# Patient Record
Sex: FEMALE | Race: WHITE | Employment: UNEMPLOYED | URBAN - METROPOLITAN AREA
[De-identification: names, ages, dates, MRNs, and addresses within clinical notes are randomized per-mention and may not be internally consistent; named-entity substitution may affect disease eponyms.]

---

## 2017-10-22 ENCOUNTER — ED HISTORICAL/CONVERTED ENCOUNTER (OUTPATIENT)
Dept: OTHER | Age: 32
End: 2017-10-22

## 2017-11-02 ENCOUNTER — OP HISTORICAL/CONVERTED ENCOUNTER (OUTPATIENT)
Dept: OTHER | Age: 32
End: 2017-11-02

## 2017-11-06 ENCOUNTER — ED HISTORICAL/CONVERTED ENCOUNTER (OUTPATIENT)
Dept: OTHER | Age: 32
End: 2017-11-06

## 2017-12-03 ENCOUNTER — ED HISTORICAL/CONVERTED ENCOUNTER (OUTPATIENT)
Dept: OTHER | Age: 32
End: 2017-12-03

## 2018-01-04 ENCOUNTER — ED HISTORICAL/CONVERTED ENCOUNTER (OUTPATIENT)
Dept: OTHER | Age: 33
End: 2018-01-04

## 2019-02-18 ENCOUNTER — ED HISTORICAL/CONVERTED ENCOUNTER (OUTPATIENT)
Dept: OTHER | Age: 34
End: 2019-02-18

## 2019-08-21 ENCOUNTER — OFFICE VISIT (OUTPATIENT)
Dept: NEUROLOGY | Age: 34
End: 2019-08-21

## 2019-08-21 VITALS
DIASTOLIC BLOOD PRESSURE: 74 MMHG | TEMPERATURE: 98.2 F | HEART RATE: 78 BPM | OXYGEN SATURATION: 97 % | HEIGHT: 65 IN | SYSTOLIC BLOOD PRESSURE: 118 MMHG | RESPIRATION RATE: 16 BRPM | WEIGHT: 179 LBS | BODY MASS INDEX: 29.82 KG/M2

## 2019-08-21 DIAGNOSIS — G43.019 INTRACTABLE MIGRAINE WITHOUT AURA AND WITHOUT STATUS MIGRAINOSUS: Primary | ICD-10-CM

## 2019-08-21 DIAGNOSIS — R20.0 LEFT ARM NUMBNESS: ICD-10-CM

## 2019-08-21 RX ORDER — BUTALBITAL, ACETAMINOPHEN AND CAFFEINE 50; 325; 40 MG/1; MG/1; MG/1
TABLET ORAL
Refills: 0 | COMMUNITY
Start: 2019-07-17 | End: 2019-10-10 | Stop reason: ALTCHOICE

## 2019-08-21 RX ORDER — NORTRIPTYLINE HYDROCHLORIDE 25 MG/1
25 CAPSULE ORAL
Qty: 30 CAP | Refills: 1 | Status: SHIPPED | OUTPATIENT
Start: 2019-08-21

## 2019-08-21 RX ORDER — ZOLMITRIPTAN 5 MG/1
TABLET, FILM COATED ORAL
Qty: 9 TAB | Refills: 1 | Status: SHIPPED | OUTPATIENT
Start: 2019-08-21 | End: 2019-10-10

## 2019-08-21 RX ORDER — ACETAMINOPHEN 325 MG/1
TABLET ORAL
COMMUNITY

## 2019-08-21 NOTE — PROGRESS NOTES
Chief Complaint   Patient presents with    Migraine     numbness in Left arm     Visit Vitals  /74 (BP 1 Location: Left arm, BP Patient Position: Sitting)   Pulse 78   Temp 98.2 °F (36.8 °C) (Oral)   Resp 16   Ht 5' 5\" (1.651 m)   Wt 81.2 kg (179 lb)   SpO2 97%   BMI 29.79 kg/m²

## 2019-08-21 NOTE — PROGRESS NOTES
Name: Jorge L Ogden      :  1985    PCP:   No primary care provider on file. Referring:  Self  MRN:   <[id number]>    Chief Complaint:   Chief Complaint   Patient presents with    Migraine     numbness in Left arm       HISTORY OF PRESENT ILLNESS:       This is a 36 y.o. female with PMHx Asthma, Migraine (starting around 14 yo), presents for evaluation of migraine and left arm numbness. Pt describes she's had migraine since teenager. Was having migraine 1-2 days a month up until aroudn 2 months ago when they became more frequent, 3-4 days a week. Denies any precipitating head injury. Describes migraine as involving back of head, + nausea, no vomiting, +light sensitive, no sound sensitivity. Sometimes she'll have blurry vision before the headache, but denies any formed visual patterns/ aura prior migraine. Duration: 3 hours to remainder of day. Taking tylenol for headaches 4 days a week (2 tabs up to 3 times a day) for the past 3 months. Recalls being on Topamax in -, reduced headaches, got pregnant so she stopped taking it. Didn't like it due to change taste of soda. She reports 4 episodes within the past few months where she was having severe headache and also had left arm numbness. Has been to ER ([facility], [facility], [facility]) once for this and they did CT head which was reportedly normal.  Treated her for headache and recommended she Neurology to discuss getting MRI Brain. She tried to f/u with her prior Neurologist/ Dr Franky Cade but she needed to get a referral.  Increased financial stressors, boyfriend's hospital bills, etc.       Complete Review of Systems: + anxiety, constipation, fatigue, headaches, occasional nausea, decreased appetite, snoring, weight change; otherwise as noted in HPI     Allergies not on file     No past medical history on file.        Current Outpatient Medications   Medication Sig Dispense Refill    acetaminophen (TYLENOL) 325 mg tablet Take by mouth every four (4) hours as needed for Pain.  butalbital-acetaminophen-caffeine (FIORICET, ESGIC) -40 mg per tablet TAKE 1 2 TABLET BY MOUTH EVERY 4 TO 6 HOURS AS NEEDED FOR MIGRAINE HEADACHE  0    nortriptyline (PAMELOR) 25 mg capsule Take 1 Cap by mouth nightly. Anti-depressant to reduce headache frequency 30 Cap 1    ZOLMitriptan (ZOMIG) 5 mg tablet Take one tablet at onset of migraine. Limit use to 2 days per week. 9 Tab 1     No past surgical history on file. No family history on file.   Social History     Socioeconomic History    Marital status: SINGLE     Spouse name: Not on file    Number of children: Not on file    Years of education: Not on file    Highest education level: Not on file   Occupational History    Not on file   Social Needs    Financial resource strain: Not on file    Food insecurity:     Worry: Not on file     Inability: Not on file    Transportation needs:     Medical: Not on file     Non-medical: Not on file   Tobacco Use    Smoking status: Not on file   Substance and Sexual Activity    Alcohol use: Not on file    Drug use: Not on file    Sexual activity: Not on file   Lifestyle    Physical activity:     Days per week: Not on file     Minutes per session: Not on file    Stress: Not on file   Relationships    Social connections:     Talks on phone: Not on file     Gets together: Not on file     Attends Alevism service: Not on file     Active member of club or organization: Not on file     Attends meetings of clubs or organizations: Not on file     Relationship status: Not on file    Intimate partner violence:     Fear of current or ex partner: Not on file     Emotionally abused: Not on file     Physically abused: Not on file     Forced sexual activity: Not on file   Other Topics Concern    Not on file   Social History Narrative    Not on file       PHYSICAL EXAM  Vitals:    08/21/19 0902   BP: 118/74   BP 1 Location: Left arm   BP Patient Position: Sitting   Pulse: 78   Resp: 16   Temp: 98.2 °F (36.8 °C)   TempSrc: Oral   SpO2: 97%   Weight: 81.2 kg (179 lb)   Height: 5' 5\" (1.651 m)       General:  Alert, cooperative, NAD   Head:  Normocephalic, atraumatic. Eyes:  Conjunctivae/corneas clear   Lungs:  Heart:  Non labored breathing  Regular rate, rhythm   Extremities: No edema. Skin: No rashes    Neurologic Exam       Language: normal  Memory:  Alert, oriented to person, place, situation    Cranial Nerves:  I: smell Not tested   II: visual fields Full to confrontation   II: pupils Equal, round, reactive to light   II: optic disc No papilledema   III,VII: ptosis none   III,IV,VI: extraocular muscles  normal   V: facial light touch sensation  normal   VII: facial muscle function  symmetric   VIII: hearing symmetric   IX: soft palate elevation  normal   XI: sternocleidomastoid strength 5/5   XII: tongue  midline      Motor: normal bulk, tone, strength in all exts  Sensory: intact to LT, PP, temp, vibration x 4 exts   Cerebellar: no rest, postural, or intention tremor  Normal FNF and H-Shin bilaterally  Reflexes: 2+ throughout  Plantar response: neutral bilaterally    Gait: antalgic gait due to right ankle pain (recently twisted ankle)   Romberg negative       ASSESSMENT AND PLAN    ICD-10-CM ICD-9-CM    1. Intractable migraine without aura and without status migrainosus G43.019 346.11 MRI BRAIN WO CONT      nortriptyline (PAMELOR) 25 mg capsule      ZOLMitriptan (ZOMIG) 5 mg tablet   2.  Left arm numbness R20.0 782.0 MRI BRAIN WO CONT      EEG       Check MRI Brain and EEG regarding left arm numbness  Rx'd Nortriptyline 25 mg QHS to reduce headache frequency  Rx'd Zomig 5 mg tablet to use up to 2 days a week prn migraine  (pt says she's tried/ failed: Imitrex tablet, Maxalt tablet)  Advised to stop using all OTC headache preventives, causing rebound headache  Pt agreeable with above plan  F/u in 6 weeks      Signed By: Tino Ferrer MD     August 21, 2019

## 2019-08-26 ENCOUNTER — DOCUMENTATION ONLY (OUTPATIENT)
Dept: NEUROLOGY | Age: 34
End: 2019-08-26

## 2019-08-26 NOTE — PROCEDURES
Name:   Jese Almeida  YOB: 1985  MRN:   [id number]           Procedure:   EEG     Location:   [facility]  Date of Recordin19  Date of Interpretation:    19    Interpreting physician: Tameka Pinto MD  Requesting provider:   As above     Indication: 36 y.o. female with complaints of numbness in left arm    Current medications: has a current medication list which includes the following prescription(s): acetaminophen, butalbital-acetaminophen-caffeine, nortriptyline, and zolmitriptan. Technical:   Digital EEG recorded in 10-20 international placement system, multiple montages    Interpretation:   Patient level of alertness: awake  Background pattern: symmetric. Posterior dominant rhythm: 8-9 Hz. Photic stimulation: no clear driving response on either side. Hyperventilation: not performed. Drowsiness recorded: no.  Sleep recorded: no.  Single lead EKG: no abnormalities. Areas of focal slowing: none. Epileptiform discharges: none. Electrographic seizures: none. Impression: This is a normal awake EEG recording. Clinical correlation is necessary.         Tameka Pinto MD  [facility]

## 2019-09-03 ENCOUNTER — HOSPITAL ENCOUNTER (OUTPATIENT)
Dept: MRI IMAGING | Age: 34
Discharge: HOME | End: 2019-09-03
Attending: PSYCHIATRY & NEUROLOGY
Payer: MEDICAID

## 2019-09-03 DIAGNOSIS — R20.0 LEFT ARM NUMBNESS: ICD-10-CM

## 2019-09-03 DIAGNOSIS — G43.019 INTRACTABLE MIGRAINE WITHOUT AURA AND WITHOUT STATUS MIGRAINOSUS: ICD-10-CM

## 2019-09-03 PROCEDURE — 70551 MRI BRAIN STEM W/O DYE: CPT

## 2019-10-10 ENCOUNTER — OFFICE VISIT (OUTPATIENT)
Dept: NEUROLOGY | Age: 34
End: 2019-10-10

## 2019-10-10 VITALS — WEIGHT: 179 LBS | RESPIRATION RATE: 20 BRPM | HEIGHT: 65 IN | BODY MASS INDEX: 29.82 KG/M2

## 2019-10-10 DIAGNOSIS — G43.019 INTRACTABLE MIGRAINE WITHOUT AURA AND WITHOUT STATUS MIGRAINOSUS: Primary | ICD-10-CM

## 2019-10-10 RX ORDER — ELETRIPTAN HYDROBROMIDE 40 MG/1
TABLET, FILM COATED ORAL
Qty: 9 TAB | Refills: 2 | Status: SHIPPED | OUTPATIENT
Start: 2019-10-10

## 2019-10-10 NOTE — PROGRESS NOTES
Neurology Progress Note    Patient ID:   Maru Rodrigues  [id number]  36 y.o.  1985    Date of Office Visit: 10/10/19    Chief Complaint   Patient presents with    Headache     Interval Hx:     # of headache days a week: 2-3  # of migraine days a week: 2    Has not been taking Nortriptyline regularly, no SEFx, just doesn't like idea of taking a daily medication. Zomig caused her to have hives (went to ER)      Brief ROS: as noted above or otherwise negative    Objective: Allergies   Allergen Reactions    Latex Hives    Amoxicillin Hives    Morphine Nausea and Vomiting    Pcn [Penicillins] Hives       PMHx:  Past Medical History:   Diagnosis Date    Asthma      PSHx:  has a past surgical history that includes hx gyn and hx gyn. Current Outpatient Medications on File Prior to Visit   Medication Sig    acetaminophen (TYLENOL) 325 mg tablet Take  by mouth every four (4) hours as needed for Pain.  nortriptyline (PAMELOR) 25 mg capsule Take 1 Cap by mouth nightly. Anti-depressant to reduce headache frequency     No current facility-administered medications on file prior to visit. Physical Exam  Vitals:    10/10/19 1001   Resp: 20   Weight: 81.2 kg (179 lb)   Height: 5' 5\" (1.651 m)       General:   Mental status: Awake, alert, cooperative. Neck: supple  Extremities: no edema  Skin: no rashes    Neuro Exam:    CNs:   Facial movements: symmetric. Visual fields; intact in all quadrants, bilateral EOM: conjugate eye movements bilateral  Hearing: normal  Speech: no aphasia, no dysarthria, normal fluency    Motor:  Strength: 5/5 in upper and lower extremities, symmetric. Coordination: No resting, postural, or intention tremors. Sensory: intact to LT in all exts. Reflexes:  1+ throughout  Gait: not observed    Assessment:       ICD-10-CM ICD-9-CM    1.  Intractable migraine without aura and without status migrainosus G43.019 346.11 eletriptan (RELPAX) 40 mg tablet     Discussed with patient that with frequent migraine, the only way to reduce headache frequency is to take a daily headache preventive medication, such as Nortriptyline. Discussed other meds (topamax, propranolol, etc) and their potential SEFx, or not taking any headache preventive medication (which I didn't recommend in her situation). She was agreeable to trying to take the Nortriptyline QHS to reduce headache frequency. D/c'd zomig from her med list.  Rx'd Relpax 40 mg prn migraine, limit use up to 2 days a week. Follow up in 3 months.        Signed By: Waqas Rose MD     October 10, 2019

## 2021-05-20 ENCOUNTER — HOSPITAL ENCOUNTER (EMERGENCY)
Age: 36
Discharge: HOME | End: 2021-05-21
Admitting: EMERGENCY MEDICINE
Payer: MEDICAID

## 2021-05-20 VITALS
SYSTOLIC BLOOD PRESSURE: 126 MMHG | OXYGEN SATURATION: 98 % | WEIGHT: 178 LBS | BODY MASS INDEX: 29.66 KG/M2 | DIASTOLIC BLOOD PRESSURE: 83 MMHG | HEIGHT: 65 IN | HEART RATE: 104 BPM | TEMPERATURE: 99 F | RESPIRATION RATE: 14 BRPM

## 2021-05-20 DIAGNOSIS — R50.9 FEVER, UNSPECIFIED FEVER CAUSE: ICD-10-CM

## 2021-05-20 DIAGNOSIS — R51.9 NONINTRACTABLE HEADACHE, UNSPECIFIED CHRONICITY PATTERN, UNSPECIFIED HEADACHE TYPE: Primary | ICD-10-CM

## 2021-05-20 LAB
ALBUMIN SERPL-MCNC: 3 G/DL (ref 3.5–5)
ALBUMIN/GLOB SERPL: 0.7 {RATIO} (ref 1.1–2.2)
ALP SERPL-CCNC: 116 U/L (ref 45–117)
ALT SERPL-CCNC: 41 U/L (ref 12–78)
ANION GAP SERPL CALC-SCNC: 7 MMOL/L (ref 5–15)
APPEARANCE UR: ABNORMAL
AST SERPL W P-5'-P-CCNC: 31 U/L (ref 15–37)
BACTERIA URNS QL MICRO: NEGATIVE /HPF
BASOPHILS # BLD: 0.1 K/UL (ref 0–0.1)
BASOPHILS NFR BLD: 1 % (ref 0–1)
BILIRUB SERPL-MCNC: 0.3 MG/DL (ref 0.2–1)
BILIRUB UR QL: NEGATIVE
BUN SERPL-MCNC: 9 MG/DL (ref 6–20)
BUN/CREAT SERPL: 13 (ref 12–20)
CA-I BLD-MCNC: 9 MG/DL (ref 8.5–10.1)
CHLORIDE SERPL-SCNC: 106 MMOL/L (ref 97–108)
CO2 SERPL-SCNC: 23 MMOL/L (ref 21–32)
COLOR UR: ABNORMAL
CREAT SERPL-MCNC: 0.67 MG/DL (ref 0.55–1.02)
DIFFERENTIAL METHOD BLD: ABNORMAL
EOSINOPHIL # BLD: 0.4 K/UL (ref 0–0.4)
EOSINOPHIL NFR BLD: 4 % (ref 0–7)
ERYTHROCYTE [DISTWIDTH] IN BLOOD BY AUTOMATED COUNT: 12.8 % (ref 11.5–14.5)
GLOBULIN SER CALC-MCNC: 4.1 G/DL (ref 2–4)
GLUCOSE SERPL-MCNC: 97 MG/DL (ref 65–100)
GLUCOSE UR STRIP.AUTO-MCNC: NEGATIVE MG/DL
HCG SERPL QL: NEGATIVE
HCT VFR BLD AUTO: 40.7 % (ref 35–47)
HGB BLD-MCNC: 13.5 G/DL (ref 11.5–16)
HGB UR QL STRIP: ABNORMAL
IMM GRANULOCYTES # BLD AUTO: 0 K/UL (ref 0–0.04)
IMM GRANULOCYTES NFR BLD AUTO: 0 % (ref 0–0.5)
KETONES UR QL STRIP.AUTO: NEGATIVE MG/DL
LEUKOCYTE ESTERASE UR QL STRIP.AUTO: NEGATIVE
LYMPHOCYTES # BLD: 1.9 K/UL (ref 0.8–3.5)
LYMPHOCYTES NFR BLD: 20 % (ref 12–49)
MCH RBC QN AUTO: 29 PG (ref 26–34)
MCHC RBC AUTO-ENTMCNC: 33.2 G/DL (ref 30–36.5)
MCV RBC AUTO: 87.5 FL (ref 80–99)
MONOCYTES # BLD: 1.6 K/UL (ref 0–1)
MONOCYTES NFR BLD: 17 % (ref 5–13)
NEUTS SEG # BLD: 5.7 K/UL (ref 1.8–8)
NEUTS SEG NFR BLD: 58 % (ref 32–75)
NITRITE UR QL STRIP.AUTO: NEGATIVE
NRBC # BLD: 0 K/UL (ref 0–0.01)
NRBC BLD-RTO: 0 PER 100 WBC
PH UR STRIP: 7 [PH] (ref 5–8)
PLATELET # BLD AUTO: 300 K/UL (ref 150–400)
PMV BLD AUTO: 9.9 FL (ref 8.9–12.9)
POTASSIUM SERPL-SCNC: 4.3 MMOL/L (ref 3.5–5.1)
PROT SERPL-MCNC: 7.1 G/DL (ref 6.4–8.2)
PROT UR STRIP-MCNC: NEGATIVE MG/DL
RBC # BLD AUTO: 4.65 M/UL (ref 3.8–5.2)
RBC #/AREA URNS HPF: ABNORMAL /HPF (ref 0–5)
SODIUM SERPL-SCNC: 136 MMOL/L (ref 136–145)
SP GR UR REFRACTOMETRY: <1.005 (ref 1–1.03)
UROBILINOGEN UR QL STRIP.AUTO: 0.1 EU/DL (ref 0.1–1)
WBC # BLD AUTO: 9.8 K/UL (ref 3.6–11)
WBC URNS QL MICRO: ABNORMAL /HPF (ref 0–4)

## 2021-05-20 PROCEDURE — 74011636637 HC RX REV CODE- 636/637: Performed by: NURSE PRACTITIONER

## 2021-05-20 PROCEDURE — 36415 COLL VENOUS BLD VENIPUNCTURE: CPT

## 2021-05-20 PROCEDURE — 74011250636 HC RX REV CODE- 250/636: Performed by: NURSE PRACTITIONER

## 2021-05-20 PROCEDURE — 81001 URINALYSIS AUTO W/SCOPE: CPT

## 2021-05-20 PROCEDURE — 99283 EMERGENCY DEPT VISIT LOW MDM: CPT

## 2021-05-20 PROCEDURE — 96372 THER/PROPH/DIAG INJ SC/IM: CPT

## 2021-05-20 PROCEDURE — 85025 COMPLETE CBC W/AUTO DIFF WBC: CPT

## 2021-05-20 PROCEDURE — 74011250637 HC RX REV CODE- 250/637: Performed by: NURSE PRACTITIONER

## 2021-05-20 PROCEDURE — 80053 COMPREHEN METABOLIC PANEL: CPT

## 2021-05-20 PROCEDURE — 84703 CHORIONIC GONADOTROPIN ASSAY: CPT

## 2021-05-20 RX ORDER — ACETAMINOPHEN 325 MG/1
650 TABLET ORAL ONCE
Status: COMPLETED | OUTPATIENT
Start: 2021-05-20 | End: 2021-05-20

## 2021-05-20 RX ORDER — SUMATRIPTAN 6 MG/.5ML
6 INJECTION, SOLUTION SUBCUTANEOUS
Status: COMPLETED | OUTPATIENT
Start: 2021-05-20 | End: 2021-05-20

## 2021-05-20 RX ADMIN — SUMATRIPTAN 6 MG: 6 INJECTION, SOLUTION SUBCUTANEOUS at 22:42

## 2021-05-20 RX ADMIN — SODIUM CHLORIDE 1000 ML: 9 INJECTION, SOLUTION INTRAVENOUS at 21:43

## 2021-05-20 RX ADMIN — ACETAMINOPHEN 650 MG: 325 TABLET ORAL at 21:43

## 2021-05-20 NOTE — Clinical Note
[facility] [facility] EMERGENCY DEPT  [facility] [place] [id number]  [phone]    Work/School Note    Date: 5/20/2021    To Whom It May concern:    Jung Pace was seen and treated today in the emergency room by the following provider(s):  Nurse Practitioner: Tala Zheng NP. Jung Pace is excused from work/school on 05/21/21 and 05/22/21. She is medically clear to return to work/school on 5/23/2021.        Sincerely,          Young Buckley NP

## 2021-05-21 NOTE — ED TRIAGE NOTES
Patient states she started with a migraine yesterday, along with a fever. Patient states she was seen at the [facility] ER lastnight, tested negative for both the rapid and send out COVID test.  Patient states she started with bilateral leg cramping and lower back pain that started today.

## 2021-05-21 NOTE — DISCHARGE INSTRUCTIONS
Thank you! Thank you for allowing me to care for you in the emergency department. I sincerely hope that you are satisfied with your visit today. It is my goal to provide you with excellent care. Below you will find a list of your labs and imaging from your visit today. Should you have any questions regarding these results please do not hesitate to call the emergency department. Labs -     Recent Results (from the past 12 hour(s))   URINALYSIS W/MICROSCOPIC    Collection Time: 05/20/21  9:30 PM   Result Value Ref Range    Color Yellow/Straw      Appearance Turbid (A) Clear      Specific gravity <1.005 1.003 - 1.030    pH (UA) 7.0 5.0 - 8.0      Protein Negative Negative mg/dL    Glucose Negative Negative mg/dL    Ketone Negative Negative mg/dL    Bilirubin Negative Negative      Blood Moderate (A) Negative      Urobilinogen 0.1 0.1 - 1.0 EU/dL    Nitrites Negative Negative      Leukocyte Esterase Negative Negative      WBC 0-4 0 - 4 /hpf    RBC 0-5 0 - 5 /hpf    Bacteria Negative Negative /hpf   CBC WITH AUTOMATED DIFF    Collection Time: 05/20/21  9:35 PM   Result Value Ref Range    WBC 9.8 3.6 - 11.0 K/uL    RBC 4.65 3.80 - 5.20 M/uL    HGB 13.5 11.5 - 16.0 g/dL    HCT 40.7 35.0 - 47.0 %    MCV 87.5 80.0 - 99.0 FL    MCH 29.0 26.0 - 34.0 PG    MCHC 33.2 30.0 - 36.5 g/dL    RDW 12.8 11.5 - 14.5 %    PLATELET 022 265 - 727 K/uL    MPV 9.9 8.9 - 12.9 FL    NRBC 0.0 0.0  WBC    ABSOLUTE NRBC 0.00 0.00 - 0.01 K/uL    NEUTROPHILS 58 32 - 75 %    LYMPHOCYTES 20 12 - 49 %    MONOCYTES 17 (H) 5 - 13 %    EOSINOPHILS 4 0 - 7 %    BASOPHILS 1 0 - 1 %    IMMATURE GRANULOCYTES 0 0 - 0.5 %    ABS. NEUTROPHILS 5.7 1.8 - 8.0 K/UL    ABS. LYMPHOCYTES 1.9 0.8 - 3.5 K/UL    ABS. MONOCYTES 1.6 (H) 0.0 - 1.0 K/UL    ABS. EOSINOPHILS 0.4 0.0 - 0.4 K/UL    ABS. BASOPHILS 0.1 0.0 - 0.1 K/UL    ABS. IMM.  GRANS. 0.0 0.00 - 0.04 K/UL    DF AUTOMATED     METABOLIC PANEL, COMPREHENSIVE    Collection Time: 05/20/21  9:35 PM Result Value Ref Range    Sodium 136 136 - 145 mmol/L    Potassium 4.3 3.5 - 5.1 mmol/L    Chloride 106 97 - 108 mmol/L    CO2 23 21 - 32 mmol/L    Anion gap 7 5 - 15 mmol/L    Glucose 97 65 - 100 mg/dL    BUN 9 6 - 20 mg/dL    Creatinine 0.67 0.55 - 1.02 mg/dL    BUN/Creatinine ratio 13 12 - 20      GFR est AA >60 >60 ml/min/1.73m2    GFR est non-AA >60 >60 ml/min/1.73m2    Calcium 9.0 8.5 - 10.1 mg/dL    Bilirubin, total 0.3 0.2 - 1.0 mg/dL    AST (SGOT) 31 15 - 37 U/L    ALT (SGPT) 41 12 - 78 U/L    Alk. phosphatase 116 45 - 117 U/L    Protein, total 7.1 6.4 - 8.2 g/dL    Albumin 3.0 (L) 3.5 - 5.0 g/dL    Globulin 4.1 (H) 2.0 - 4.0 g/dL    A-G Ratio 0.7 (L) 1.1 - 2.2     HCG QL SERUM    Collection Time: 05/20/21  9:35 PM   Result Value Ref Range    HCG, Ql. Negative Negative         Radiologic Studies -   No orders to display     CT Results  (Last 48 hours)      None          CXR Results  (Last 48 hours)      None               If you feel that you have not received excellent quality care or timely care, please ask to speak to the nurse manager. Please choose us in the future for your continued health care needs. ------------------------------------------------------------------------------------------------------------  The exam and treatment you received in the Emergency Department were for an urgent problem and are not intended as complete care. It is important that you follow-up with a doctor, nurse practitioner, or physician assistant to:  (1) confirm your diagnosis,  (2) re-evaluation of changes in your illness and treatment, and  (3) for ongoing care. If your symptoms become worse or you do not improve as expected and you are unable to reach your usual health care provider, you should return to the Emergency Department. We are available 24 hours a day. Please take your discharge instructions with you when you go to your follow-up appointment.      If you have any problem arranging a follow-up appointment, contact the Emergency Department immediately. If a prescription has been provided, please have it filled as soon as possible to prevent a delay in treatment. Read the entire medication instruction sheet provided to you by the pharmacy. If you have any questions or reservations about taking the medication due to side effects or interactions with other medications, please call your primary care physician or contact the ER to speak with the charge nurse. Make an appointment with your family doctor or the physician you were referred to for follow-up of this visit as instructed on your discharge paperwork, as this is a mandatory follow-up. Return to the ER if you are unable to be seen or if you are unable to be seen in a timely manner. If you have any problem arranging the follow-up visit, contact the Emergency Department immediately.

## 2021-05-21 NOTE — ED PROVIDER NOTES
EMERGENCY DEPARTMENT HISTORY AND PHYSICAL EXAM      Date: 2021  Patient Name: Andres Stuart      History of Presenting Illness     Chief Complaint   Patient presents with    Headache    Fever       History Provided By: Patient    HPI: Andres Stuart, 30 y.o. female with a past medical history significant Migraines presents to the ED with cc of fever migraine for the past 2 days. She reports being evaluated at [facility] with [facility] scan, negative Covid test x2 however she continues with elevated temp of 100.8 and 100.4. Patient reports was advised by Job to come for further evaluation. Patient reports having increased stress due to starting a new role at her job was under the care of a neurologist in the past however has not followed up. She denies any change in vision, shortness of breath, CP, fever, chills, nausea, vomiting, sinus pain or pressure. There are no other complaints, changes, or physical findings at this time. PCP: Iris Laws MD    Current Outpatient Medications   Medication Sig Dispense Refill    eletriptan (RELPAX) 40 mg tablet Take one tablet at onset of migraine. May repeat 1 tablet in 2 hours if headache remains. Limit use to 2 days a week. 9 Tab 2    acetaminophen (TYLENOL) 325 mg tablet Take  by mouth every four (4) hours as needed for Pain.  nortriptyline (PAMELOR) 25 mg capsule Take 1 Cap by mouth nightly. Anti-depressant to reduce headache frequency 30 Cap 1       Past History     Past Medical History:  Past Medical History:   Diagnosis Date    Asthma        Past Surgical History:  Past Surgical History:   Procedure Laterality Date    HX  SECTION      HX CHOLECYSTECTOMY      HX GYN      LEEP    HX GYN      ectopic pregnancy    HX TONSIL AND ADENOIDECTOMY         Family History:  History reviewed. No pertinent family history.     Social History:  Social History     Tobacco Use    Smoking status: Current Every Day Smoker     Packs/day: 0.50    Smokeless tobacco: Never Used   Substance Use Topics    Alcohol use: Not Currently     Comment: pt states she stopped drinking 4 days ago; pt states she was drinking 4 shots a night    Drug use: No       Allergies: Allergies   Allergen Reactions    Latex Other (comments)     PATIENT STATES SHE HAS USED LATEX AT WORK WITH NO REACTION; NO ALLERGY    Amoxicillin Hives    Morphine Nausea and Vomiting    Pcn [Penicillins] Hives         Review of Systems     Review of Systems   Constitutional: Positive for fever. Negative for chills. HENT: Negative for congestion, sinus pressure and sinus pain. Respiratory: Negative for cough and shortness of breath. Cardiovascular: Negative for chest pain and leg swelling. Gastrointestinal: Negative for abdominal pain, nausea and vomiting. Genitourinary: Negative for dysuria, frequency and urgency. Musculoskeletal: Negative for arthralgias and myalgias. Skin: Negative. Neurological: Positive for headaches. Negative for dizziness, weakness, light-headedness and numbness. Psychiatric/Behavioral: Negative. Physical Exam     Physical Exam  Vitals and nursing note reviewed. Constitutional:       General: She is not in acute distress. Appearance: Normal appearance. She is normal weight. She is not ill-appearing or toxic-appearing. HENT:      Head: Normocephalic and atraumatic. Right Ear: Hearing normal.      Left Ear: Hearing normal.      Nose: Nose normal.      Mouth/Throat:      Mouth: Mucous membranes are moist.   Eyes:      General: Lids are normal.      Extraocular Movements: Extraocular movements intact. Pupils: Pupils are equal, round, and reactive to light. Cardiovascular:      Rate and Rhythm: Normal rate and regular rhythm. Pulses: Normal pulses. Radial pulses are 2+ on the right side and 2+ on the left side. Dorsalis pedis pulses are 2+ on the right side and 2+ on the left side.    Pulmonary:      Effort: Pulmonary effort is normal. No accessory muscle usage or respiratory distress. Breath sounds: Normal breath sounds. No wheezing or rhonchi. Abdominal:      General: Bowel sounds are normal.      Palpations: Abdomen is soft. Tenderness: There is no abdominal tenderness. There is no right CVA tenderness or left CVA tenderness. Musculoskeletal:      Cervical back: Normal range of motion and neck supple. No muscular tenderness. Right lower leg: No edema. Left lower leg: No edema. Feet:      Right foot:      Skin integrity: No skin breakdown. Left foot:      Skin integrity: No skin breakdown. Skin:     General: Skin is warm and dry. Capillary Refill: Capillary refill takes less than 2 seconds. Findings: No abrasion, bruising, ecchymosis, erythema or signs of injury. Neurological:      General: No focal deficit present. Mental Status: She is alert and oriented to person, place, and time. GCS: GCS eye subscore is 4. GCS verbal subscore is 5. GCS motor subscore is 6. Cranial Nerves: Cranial nerves are intact. Sensory: Sensation is intact. Psychiatric:         Attention and Perception: Attention normal.         Mood and Affect: Mood normal.         Behavior: Behavior normal. Behavior is cooperative.          Cognition and Memory: Cognition normal.         Lab and Diagnostic Study Results     Labs -     Recent Results (from the past 12 hour(s))   URINALYSIS W/MICROSCOPIC    Collection Time: 05/20/21  9:30 PM   Result Value Ref Range    Color Yellow/Straw      Appearance Turbid (A) Clear      Specific gravity <1.005 1.003 - 1.030    pH (UA) 7.0 5.0 - 8.0      Protein Negative Negative mg/dL    Glucose Negative Negative mg/dL    Ketone Negative Negative mg/dL    Bilirubin Negative Negative      Blood Moderate (A) Negative      Urobilinogen 0.1 0.1 - 1.0 EU/dL    Nitrites Negative Negative      Leukocyte Esterase Negative Negative      WBC 0-4 0 - 4 /hpf    RBC 0-5 0 - 5 /hpf    Bacteria Negative Negative /hpf   CBC WITH AUTOMATED DIFF    Collection Time: 05/20/21  9:35 PM   Result Value Ref Range    WBC 9.8 3.6 - 11.0 K/uL    RBC 4.65 3.80 - 5.20 M/uL    HGB 13.5 11.5 - 16.0 g/dL    HCT 40.7 35.0 - 47.0 %    MCV 87.5 80.0 - 99.0 FL    MCH 29.0 26.0 - 34.0 PG    MCHC 33.2 30.0 - 36.5 g/dL    RDW 12.8 11.5 - 14.5 %    PLATELET 601 691 - 446 K/uL    MPV 9.9 8.9 - 12.9 FL    NRBC 0.0 0.0  WBC    ABSOLUTE NRBC 0.00 0.00 - 0.01 K/uL    NEUTROPHILS 58 32 - 75 %    LYMPHOCYTES 20 12 - 49 %    MONOCYTES 17 (H) 5 - 13 %    EOSINOPHILS 4 0 - 7 %    BASOPHILS 1 0 - 1 %    IMMATURE GRANULOCYTES 0 0 - 0.5 %    ABS. NEUTROPHILS 5.7 1.8 - 8.0 K/UL    ABS. LYMPHOCYTES 1.9 0.8 - 3.5 K/UL    ABS. MONOCYTES 1.6 (H) 0.0 - 1.0 K/UL    ABS. EOSINOPHILS 0.4 0.0 - 0.4 K/UL    ABS. BASOPHILS 0.1 0.0 - 0.1 K/UL    ABS. IMM. GRANS. 0.0 0.00 - 0.04 K/UL    DF AUTOMATED     METABOLIC PANEL, COMPREHENSIVE    Collection Time: 05/20/21  9:35 PM   Result Value Ref Range    Sodium 136 136 - 145 mmol/L    Potassium 4.3 3.5 - 5.1 mmol/L    Chloride 106 97 - 108 mmol/L    CO2 23 21 - 32 mmol/L    Anion gap 7 5 - 15 mmol/L    Glucose 97 65 - 100 mg/dL    BUN 9 6 - 20 mg/dL    Creatinine 0.67 0.55 - 1.02 mg/dL    BUN/Creatinine ratio 13 12 - 20      GFR est AA >60 >60 ml/min/1.73m2    GFR est non-AA >60 >60 ml/min/1.73m2    Calcium 9.0 8.5 - 10.1 mg/dL    Bilirubin, total 0.3 0.2 - 1.0 mg/dL    AST (SGOT) 31 15 - 37 U/L    ALT (SGPT) 41 12 - 78 U/L    Alk.  phosphatase 116 45 - 117 U/L    Protein, total 7.1 6.4 - 8.2 g/dL    Albumin 3.0 (L) 3.5 - 5.0 g/dL    Globulin 4.1 (H) 2.0 - 4.0 g/dL    A-G Ratio 0.7 (L) 1.1 - 2.2     HCG QL SERUM    Collection Time: 05/20/21  9:35 PM   Result Value Ref Range    HCG, Ql. Negative Negative         Radiologic Studies -   [unfilled]  CT Results  (Last 48 hours)    None        CXR Results  (Last 48 hours)    None          Medical Decision Making and ED Course   - I am the first and primary provider for this patient AND AM THE PRIMARY PROVIDER OF RECORD. - I reviewed the vital signs, available nursing notes, past medical history, past surgical history, family history and social history. - Initial assessment performed. The patients presenting problems have been discussed, and the staff are in agreement with the care plan formulated and outlined with them. I have encouraged them to ask questions as they arise throughout their visit. Vital Signs-Reviewed the patient's vital signs. Patient Vitals for the past 12 hrs:   Temp Pulse Resp BP SpO2   05/20/21 2113 99 °F (37.2 °C) (!) 104 14 126/83 98 %       The patient presents with headache with a differential diagnosis of  cluster headache, meningitis/encephalitis, migraine, sinusitis and tension headache    ED Course:              Provider Notes (Medical Decision Making):   Patient's neuro exam negative, neck is supple patient is afebrile, hemodynamically stable, SPO2 98% on room air, white blood cell count within normal limits, UA negative, CMP normal, patient reports relief in headache status post Imitrex. Was tested for Covid x2 with negative results yesterday. Patient advised of supportive care follow-up with neurology verbalized understanding stable at time of discharge          Consultations:       Consultations:         Procedures and Metsa 36, NP        Disposition     Disposition: DC- Adult Discharges: All of the diagnostic tests were reviewed and questions answered. Diagnosis, care plan and treatment options were discussed. The patient understands the instructions and will follow up as directed. The patients results have been reviewed with them. They have been counseled regarding their diagnosis.   The patient verbally convey understanding and agreement of the signs, symptoms, diagnosis, treatment and prognosis and additionally agrees to follow up as recommended with their PCP in 24 - 48 hours. They also agree with the care-plan and convey that all of their questions have been answered. I have also put together some discharge instructions for them that include: 1) educational information regarding their diagnosis, 2) how to care for their diagnosis at home, as well a 3) list of reasons why they would want to return to the ED prior to their follow-up appointment, should their condition change. Discharged      DISCHARGE PLAN:  1. Current Discharge Medication List      CONTINUE these medications which have NOT CHANGED    Details   eletriptan (RELPAX) 40 mg tablet Take one tablet at onset of migraine. May repeat 1 tablet in 2 hours if headache remains. Limit use to 2 days a week. Qty: 9 Tab, Refills: 2    Associated Diagnoses: Intractable migraine without aura and without status migrainosus      acetaminophen (TYLENOL) 325 mg tablet Take  by mouth every four (4) hours as needed for Pain. nortriptyline (PAMELOR) 25 mg capsule Take 1 Cap by mouth nightly. Anti-depressant to reduce headache frequency  Qty: 30 Cap, Refills: 1    Associated Diagnoses: Intractable migraine without aura and without status migrainosus           2. Follow-up Information     Follow up With Specialties Details Why Contact Info    Alfredo Rivera MD Family Medicine Schedule an appointment as soon as possible for a visit in 2 days  [place] [phone]      Kat Albrecht MD Neurology Schedule an appointment as soon as possible for a visit in 1 week [address] [facility]  [phone]          3. Return to ED if worse   4. Current Discharge Medication List          Diagnosis     Clinical Impression:   1. Nonintractable headache, unspecified chronicity pattern, unspecified headache type    2.  Fever, unspecified fever cause        Attestations:    Kathy Blakely NP    Please note that this dictation was completed with kassie Alejandro computer voice recognition software. Quite often unanticipated grammatical, syntax, homophones, and other interpretive errors are inadvertently transcribed by the computer software. Please disregard these errors. Please excuse any errors that have escaped final proofreading. Thank you.

## 2022-04-07 ENCOUNTER — HOSPITAL ENCOUNTER (OUTPATIENT)
Dept: GENERAL RADIOLOGY | Age: 37
Discharge: HOME | End: 2022-04-07
Attending: FAMILY MEDICINE
Payer: COMMERCIAL

## 2022-04-07 DIAGNOSIS — M25.561 RIGHT KNEE PAIN, UNSPECIFIED CHRONICITY: ICD-10-CM

## 2022-04-07 PROCEDURE — 73562 X-RAY EXAM OF KNEE 3: CPT

## 2022-08-10 ENCOUNTER — APPOINTMENT (OUTPATIENT)
Dept: GENERAL RADIOLOGY | Age: 37
End: 2022-08-10
Attending: EMERGENCY MEDICINE
Payer: COMMERCIAL

## 2022-08-10 ENCOUNTER — HOSPITAL ENCOUNTER (EMERGENCY)
Age: 37
Discharge: HOME | End: 2022-08-10
Attending: EMERGENCY MEDICINE
Payer: COMMERCIAL

## 2022-08-10 VITALS
SYSTOLIC BLOOD PRESSURE: 123 MMHG | HEIGHT: 65 IN | TEMPERATURE: 98.2 F | DIASTOLIC BLOOD PRESSURE: 67 MMHG | RESPIRATION RATE: 18 BRPM | HEART RATE: 66 BPM | WEIGHT: 188 LBS | BODY MASS INDEX: 31.32 KG/M2 | OXYGEN SATURATION: 94 %

## 2022-08-10 DIAGNOSIS — H60.501 ACUTE OTITIS EXTERNA OF RIGHT EAR, UNSPECIFIED TYPE: ICD-10-CM

## 2022-08-10 DIAGNOSIS — J20.9 ACUTE BRONCHITIS, UNSPECIFIED ORGANISM: ICD-10-CM

## 2022-08-10 DIAGNOSIS — H66.90 ACUTE OTITIS MEDIA, UNSPECIFIED OTITIS MEDIA TYPE: ICD-10-CM

## 2022-08-10 DIAGNOSIS — Z20.822 SUSPECTED COVID-19 VIRUS INFECTION: ICD-10-CM

## 2022-08-10 DIAGNOSIS — J06.9 UPPER RESPIRATORY TRACT INFECTION, UNSPECIFIED TYPE: Primary | ICD-10-CM

## 2022-08-10 PROCEDURE — 71046 X-RAY EXAM CHEST 2 VIEWS: CPT

## 2022-08-10 PROCEDURE — 99283 EMERGENCY DEPT VISIT LOW MDM: CPT

## 2022-08-10 PROCEDURE — 74011250637 HC RX REV CODE- 250/637: Performed by: EMERGENCY MEDICINE

## 2022-08-10 RX ORDER — IBUPROFEN 800 MG/1
800 TABLET ORAL
Status: COMPLETED | OUTPATIENT
Start: 2022-08-10 | End: 2022-08-10

## 2022-08-10 RX ORDER — OFLOXACIN 3 MG/ML
5 SOLUTION AURICULAR (OTIC) DAILY
Qty: 5 ML | Refills: 0 | Status: SHIPPED | OUTPATIENT
Start: 2022-08-10

## 2022-08-10 RX ORDER — DEXTROMETHORPHAN HYDROBROMIDE, GUAIFENESIN 20; 400 MG/20ML; MG/20ML
5 SOLUTION ORAL EVERY 6 HOURS
Qty: 200 ML | Refills: 0 | Status: SHIPPED | OUTPATIENT
Start: 2022-08-10

## 2022-08-10 RX ORDER — ALBUTEROL SULFATE 90 UG/1
2 AEROSOL, METERED RESPIRATORY (INHALATION)
Qty: 6.7 G | Refills: 0 | Status: SHIPPED | OUTPATIENT
Start: 2022-08-10 | End: 2022-08-15

## 2022-08-10 RX ORDER — AZITHROMYCIN 250 MG/1
TABLET, FILM COATED ORAL
Qty: 6 TABLET | Refills: 0 | Status: SHIPPED | OUTPATIENT
Start: 2022-08-10 | End: 2022-10-10 | Stop reason: SDUPTHER

## 2022-08-10 RX ORDER — AZITHROMYCIN 500 MG/1
500 TABLET, FILM COATED ORAL
Status: COMPLETED | OUTPATIENT
Start: 2022-08-10 | End: 2022-08-10

## 2022-08-10 RX ADMIN — AZITHROMYCIN MONOHYDRATE 500 MG: 500 TABLET ORAL at 07:42

## 2022-08-10 RX ADMIN — IBUPROFEN 800 MG: 800 TABLET, FILM COATED ORAL at 07:42

## 2022-08-10 NOTE — Clinical Note
[address] [facility] EMERGENCY DEPT  [address] [facility] [address]  [phone]    Work/School Note    Date: 8/10/2022     To Whom It May concern:    Tomi Rubio was evaluated by the following provider(s):  Attending Provider: Jorge Rider MD.   COVID19 virus is suspected. Per the CDC guidelines we recommend home isolation until the following conditions are all met:    1. At least five days have passed since symptoms first appeared and/or had a close exposure,   2. After home isolation for five days, wearing a mask around others for the next five days,  3. At least 24 have passed since last fever without the use of fever-reducing medications and  4.  Symptoms (eg cough, shortness of breath) have improved      Sincerely,          Olga Kohler MD

## 2022-08-10 NOTE — DISCHARGE INSTRUCTIONS
Thank you! Thank you for allowing me to care for you in the emergency department. It is my goal to provide you with excellent care. If you have not received excellent quality care, please ask to speak to the nurse manager. Please fill out the survey that will come to you by mail or email since we listen to your feedback! Below you will find a list of your tests from today's visit. Should you have any questions, please do not hesitate to call the emergency department. Labs  No results found for this or any previous visit (from the past 12 hour(s)). Radiologic Studies  XR CHEST PA LAT   Final Result   No acute cardiopulmonary disease. CT Results  (Last 48 hours)      None          CXR Results  (Last 48 hours)                 08/10/22 0739  XR CHEST PA LAT Final result    Impression:  No acute cardiopulmonary disease. Narrative: Indication: Cough. Exam: PA and lateral views of the chest.       There is no prior study for direct comparison. Findings: Cardiomediastinal silhouette is within normal limits. Lungs are clear   bilaterally. Pleural spaces are normal. Osseous structures are intact.                 ------------------------------------------------------------------------------------------------------------  The exam and treatment you received in the Emergency Department were for an urgent problem and are not intended as complete care. It is important that you follow-up with a doctor, nurse practitioner, or physician assistant to:  (1) confirm your diagnosis,  (2) re-evaluation of changes in your illness and treatment, and  (3) for ongoing care. Please take your discharge instructions with you when you go to your follow-up appointment. If you have any problem arranging a follow-up appointment, contact the Emergency Department.   If your symptoms become worse or you do not improve as expected and you are unable to reach your health care provider, please return to the Emergency Department. We are available 24 hours a day. If a prescription has been provided, please have it filled as soon as possible to prevent a delay in treatment. If you have any questions or reservations about taking the medication due to side effects or interactions with other medications, please call your primary care provider or contact the ER.

## 2022-08-10 NOTE — ED PROVIDER NOTES
Flu or          EMERGENCY DEPARTMENT HISTORY AND PHYSICAL EXAM      Date: 8/10/2022  Patient Name: Debbie Egan    History of Presenting Illness     Chief Complaint   Patient presents with    Sore Throat    Cough    Headache       History Provided By: Patient    HPI: Debbie Egan, 40 y.o. female with a past medical history significant No significant past medical history presents to the ED with chief complaint of Sore Throat, Cough, and Headache  . Female 72-year-old. Female seen at [facility] emergency room. Had a strep swab that was negative. Also flu and COVID but she does not know the results are but is looking presently. Discharged home with Tonya Mccormack has not picked them up. She was concerned that she could have complaint of sore throat and right ear discomfort. Cough is kept her up all night. No x-rays or exam was done of this area. No fevers. No exposure to anyone who has been sick with COVID. LOCATION - R ear  QUALITY-  ahy  SEVERITY - mod  TIMING - 1-2d  SETTING-  with cough  RADIATION - throat  MODIFYING FACTORS- chills        There are no other complaints, changes, or physical findings at this time. PCP: Javi Wright MD    Current Outpatient Medications   Medication Sig Dispense Refill    azithromycin (Zithromax Z-Austen) 250 mg tablet Take 2 tablet p.o. day 1 then 1 tablet p.o. day 2 through 5 6 Tablet 0    albuterol (PROVENTIL HFA, VENTOLIN HFA, PROAIR HFA) 90 mcg/actuation inhaler Take 2 Puffs by inhalation every four (4) hours as needed for Wheezing for up to 5 days. 6.7 g 0    dextromethorphan-guaiFENesin (Robitussin Cough-Chest Jose Miguel DM) 5-100 mg/5 mL liqd Take 5 mL by mouth every six (6) hours. 200 mL 0    ofloxacin (FLOXIN) 0.3 % otic solution Administer 5 Drops in right ear in the morning. 5 mL 0    eletriptan (RELPAX) 40 mg tablet Take one tablet at onset of migraine. May repeat 1 tablet in 2 hours if headache remains. Limit use to 2 days a week.  9 Tab 2 acetaminophen (TYLENOL) 325 mg tablet Take  by mouth every four (4) hours as needed for Pain. nortriptyline (PAMELOR) 25 mg capsule Take 1 Cap by mouth nightly. Anti-depressant to reduce headache frequency 30 Cap 1       Past History     Past Medical History:  Past Medical History:   Diagnosis Date    Allergies     Asthma        Past Surgical History:  Past Surgical History:   Procedure Laterality Date    HX  SECTION      HX  SECTION      x2    HX CHOLECYSTECTOMY      HX CHOLECYSTECTOMY  2017    HX GYN      LEEP    HX GYN      ectopic pregnancy    HX TONSIL AND ADENOIDECTOMY         Family History:  History reviewed. No pertinent family history. Social History:  Social History     Tobacco Use    Smoking status: Every Day     Packs/day: 0.25     Types: Cigarettes    Smokeless tobacco: Never   Substance Use Topics    Alcohol use: Yes     Alcohol/week: 30.0 standard drinks     Types: 30 Standard drinks or equivalent per week     Comment: pt states she stopped drinking 4 days ago; pt states she was drinking 4 shots a night    Drug use: No       Allergies: Allergies   Allergen Reactions    Latex Other (comments)     PATIENT STATES SHE HAS USED LATEX AT WORK WITH NO REACTION; NO ALLERGY    Amoxicillin Hives    Morphine Nausea and Vomiting    Pcn [Penicillins] Hives         Review of Systems   Review of Systems   Constitutional: Negative. Negative for chills, fatigue and fever. HENT:  Positive for ear pain and sore throat. Negative for congestion and nosebleeds. Eyes: Negative. Negative for pain, discharge and visual disturbance. Respiratory:  Positive for cough. Negative for chest tightness and shortness of breath. Cardiovascular:  Negative for chest pain, palpitations and leg swelling. Gastrointestinal:  Negative for abdominal pain, blood in stool, constipation, diarrhea, nausea and vomiting. Endocrine: Negative. Genitourinary: Negative.   Negative for difficulty urinating, dysuria, pelvic pain and vaginal bleeding. Musculoskeletal: Negative. Negative for arthralgias, back pain and myalgias. Skin: Negative. Negative for rash and wound. Allergic/Immunologic: Negative. Neurological: Negative. Negative for dizziness, syncope, weakness, numbness and headaches. Hematological: Negative. Psychiatric/Behavioral: Negative. Negative for agitation, confusion and suicidal ideas. All other systems reviewed and are negative. Physical Exam   Physical Exam  Vitals and nursing note reviewed. Exam conducted with a chaperone present. Constitutional:       Appearance: Normal appearance. She is normal weight. HENT:      Head: Normocephalic and atraumatic. Right Ear: Tenderness present. Tympanic membrane is erythematous. Left Ear: Tympanic membrane normal.      Ears:      Comments: Erythema of the canal on the right side as well. Nose: Nose normal.      Mouth/Throat:      Mouth: Mucous membranes are moist.      Pharynx: Oropharynx is clear. Posterior oropharyngeal erythema present. Eyes:      Extraocular Movements: Extraocular movements intact. Conjunctiva/sclera: Conjunctivae normal.      Pupils: Pupils are equal, round, and reactive to light. Cardiovascular:      Rate and Rhythm: Normal rate and regular rhythm. Pulses: Normal pulses. Heart sounds: Normal heart sounds. Pulmonary:      Effort: Pulmonary effort is normal. No respiratory distress. Breath sounds: Normal breath sounds. Abdominal:      General: Abdomen is flat. Bowel sounds are normal. There is no distension. Palpations: Abdomen is soft. Tenderness: There is no abdominal tenderness. There is no guarding. Musculoskeletal:         General: No swelling, tenderness, deformity or signs of injury. Normal range of motion. Cervical back: Normal range of motion and neck supple. Right lower leg: No edema. Left lower leg: No edema.    Skin:     General: Skin is warm and dry. Capillary Refill: Capillary refill takes less than 2 seconds. Findings: No lesion or rash. Neurological:      General: No focal deficit present. Mental Status: She is alert and oriented to person, place, and time. Mental status is at baseline. Cranial Nerves: No cranial nerve deficit. Psychiatric:         Mood and Affect: Mood normal.         Behavior: Behavior normal.         Thought Content: Thought content normal.         Judgment: Judgment normal.       Diagnostic Study Results     Labs -   No results found for this or any previous visit (from the past 12 hour(s)). Radiologic Studies -   XR CHEST PA LAT   Final Result   No acute cardiopulmonary disease. CT Results  (Last 48 hours)      None          CXR Results  (Last 48 hours)                 08/10/22 0739  XR CHEST PA LAT Final result    Impression:  No acute cardiopulmonary disease. Narrative: Indication: Cough. Exam: PA and lateral views of the chest.       There is no prior study for direct comparison. Findings: Cardiomediastinal silhouette is within normal limits. Lungs are clear   bilaterally. Pleural spaces are normal. Osseous structures are intact. Medical Decision Making and ED Course   I am the first provider for this patient. I reviewed the vital signs, available nursing notes, past medical history, past surgical history, family history and social history. Vital Signs-Reviewed the patient's vital signs. Patient Vitals for the past 12 hrs:   Temp Pulse Resp BP SpO2   08/10/22 0743 -- 86 18 (!) 154/83 95 %   08/10/22 0644 -- -- -- -- 93 %   08/10/22 0606 98.2 °F (36.8 °C) 94 18 127/78 97 %       EKG interpretation:         Records Reviewed: Previous Hospital chart. EMS run report      ED Course:   Initial assessment performed.  The patients presenting problems have been discussed, and they are in agreement with the care plan formulated and outlined with them.  I have encouraged them to ask questions as they arise throughout their visit. Orders Placed This Encounter    XR CHEST PA LAT     Standing Status:   Standing     Number of Occurrences:   1     Order Specific Question:   Transport     Answer:   BED [2]     Order Specific Question:   Reason for Exam     Answer:   cough     Order Specific Question:   Is Patient Pregnant? Answer:   Unknown    ibuprofen (MOTRIN) tablet 800 mg    azithromycin (ZITHROMAX) tablet 500 mg     Order Specific Question:   Antibiotic Indications     Answer:   Upper Respiratory Infection    azithromycin (Zithromax Z-Austen) 250 mg tablet     Sig: Take 2 tablet p.o. day 1 then 1 tablet p.o. day 2 through 5     Dispense:  6 Tablet     Refill:  0    albuterol (PROVENTIL HFA, VENTOLIN HFA, PROAIR HFA) 90 mcg/actuation inhaler     Sig: Take 2 Puffs by inhalation every four (4) hours as needed for Wheezing for up to 5 days. Dispense:  6.7 g     Refill:  0    dextromethorphan-guaiFENesin (Robitussin Cough-Chest Jose Miguel DM) 5-100 mg/5 mL liqd     Sig: Take 5 mL by mouth every six (6) hours. Dispense:  200 mL     Refill:  0    ofloxacin (FLOXIN) 0.3 % otic solution     Sig: Administer 5 Drops in right ear in the morning. Dispense:  5 mL     Refill:  0                 Provider Notes (Medical Decision Making):   79-year-old female negative pending COVID and flu from outside facility evaluation yesterday with persistent cough and ear discomfort. Wanted a second opinion and a full examination as that was not done at the outside facility. Concern for otitis externa versus otitis media as the right ear canal is erythematous with a canal being irritated as well. Screening x-ray to evaluate for pneumonia with her cough although she has a benign exam stable vitals. Per patient her COVID and both negative from the outside facility. We will still treat for bronchitis.  And otitis       Consults              Discharged    Procedures Disposition       Emergency Department Disposition:  Discharged      Diagnosis     Clinical Impression:   1. Upper respiratory tract infection, unspecified type    2. Acute bronchitis, unspecified organism    3. Suspected COVID-19 virus infection    4. Acute otitis media, unspecified otitis media type    5. Acute otitis externa of right ear, unspecified type        Attestations:    Kerry Cantor MD    Please note that this dictation was completed with [employer], the computer voice recognition software. Quite often unanticipated grammatical, syntax, homophones, and other interpretive errors are inadvertently transcribed by the computer software. Please disregard these errors. Please excuse any errors that have escaped final proofreading. Thank you.

## 2022-08-10 NOTE — Clinical Note
[facility] EMERGENCY DEPT  [address] [place] [id number]  [phone]    Work/School Note    Date: 8/10/2022    To Whom It May concern:    Isis Caldwell was seen and treated today in the emergency room by the following provider(s):  Attending Provider: Brad Cross MD.      Isis Caldwell is excused from work/school on 08/10/22 and 08/11/22. She is medically clear to return to work/school on 8/12/2022.        Sincerely,          Ellen Padilla MD

## 2022-08-11 ENCOUNTER — HOSPITAL ENCOUNTER (EMERGENCY)
Age: 37
Discharge: HOME | End: 2022-08-11
Attending: STUDENT IN AN ORGANIZED HEALTH CARE EDUCATION/TRAINING PROGRAM
Payer: COMMERCIAL

## 2022-08-11 VITALS
BODY MASS INDEX: 31.32 KG/M2 | HEART RATE: 91 BPM | WEIGHT: 188 LBS | HEIGHT: 65 IN | TEMPERATURE: 98.5 F | OXYGEN SATURATION: 97 % | RESPIRATION RATE: 20 BRPM | DIASTOLIC BLOOD PRESSURE: 83 MMHG | SYSTOLIC BLOOD PRESSURE: 131 MMHG

## 2022-08-11 DIAGNOSIS — J06.9 UPPER RESPIRATORY TRACT INFECTION, UNSPECIFIED TYPE: ICD-10-CM

## 2022-08-11 DIAGNOSIS — J45.909 MILD ASTHMA, UNSPECIFIED WHETHER COMPLICATED, UNSPECIFIED WHETHER PERSISTENT: Primary | ICD-10-CM

## 2022-08-11 PROCEDURE — 74011000250 HC RX REV CODE- 250: Performed by: STUDENT IN AN ORGANIZED HEALTH CARE EDUCATION/TRAINING PROGRAM

## 2022-08-11 PROCEDURE — 94640 AIRWAY INHALATION TREATMENT: CPT

## 2022-08-11 PROCEDURE — 99283 EMERGENCY DEPT VISIT LOW MDM: CPT

## 2022-08-11 PROCEDURE — 74011636637 HC RX REV CODE- 636/637: Performed by: STUDENT IN AN ORGANIZED HEALTH CARE EDUCATION/TRAINING PROGRAM

## 2022-08-11 RX ORDER — IPRATROPIUM BROMIDE AND ALBUTEROL SULFATE 2.5; .5 MG/3ML; MG/3ML
3 SOLUTION RESPIRATORY (INHALATION)
Status: COMPLETED | OUTPATIENT
Start: 2022-08-11 | End: 2022-08-11

## 2022-08-11 RX ORDER — PREDNISONE 20 MG/1
40 TABLET ORAL DAILY
Qty: 8 TABLET | Refills: 0 | Status: SHIPPED | OUTPATIENT
Start: 2022-08-12 | End: 2022-08-16

## 2022-08-11 RX ORDER — PREDNISONE 20 MG/1
60 TABLET ORAL ONCE
Status: COMPLETED | OUTPATIENT
Start: 2022-08-11 | End: 2022-08-11

## 2022-08-11 RX ADMIN — PREDNISONE 60 MG: 20 TABLET ORAL at 05:58

## 2022-08-11 RX ADMIN — IPRATROPIUM BROMIDE AND ALBUTEROL SULFATE 3 ML: 2.5; .5 SOLUTION RESPIRATORY (INHALATION) at 04:56

## 2022-08-11 NOTE — ED PROVIDER NOTES
[facility]  EMERGENCY DEPARTMENT ENCOUNTER NOTE        Date: 8/11/2022  Patient Name: Dmitry Vega      History of Presenting Illness     Chief Complaint   Patient presents with    Wheezing       History Provided By: Patient    HPI: Dmitry Vega, 40 y.o. female with with past medical history of asthma who presents to the ED with shortness of breath. Patient tried using her inhaler at home and that did not help. She was diagnosed with URI recently and has an ear infection which she was prescribed eardrops. She reports that her ear pain has eased up and she feels better but now she has been complaining of cough and worsening shortness of breath that is responsive to bronchodilators at home. She reports chest pain and back pain only when she is coughing but not in between. Sore throat has improved. She is denying any nausea, vomiting, or any other complaints. There are no other complaints, changes, or physical findings at this time. PCP: Eliazar Patel MD    Current Outpatient Medications   Medication Sig Dispense Refill    [START ON 8/12/2022] predniSONE (DELTASONE) 20 mg tablet Take 2 Tablets by mouth in the morning for 4 days. With Breakfast 8 Tablet 0    azithromycin (Zithromax Z-Austen) 250 mg tablet Take 2 tablet p.o. day 1 then 1 tablet p.o. day 2 through 5 6 Tablet 0    albuterol (PROVENTIL HFA, VENTOLIN HFA, PROAIR HFA) 90 mcg/actuation inhaler Take 2 Puffs by inhalation every four (4) hours as needed for Wheezing for up to 5 days. 6.7 g 0    dextromethorphan-guaiFENesin (Robitussin Cough-Chest Jose Mgiuel DM) 5-100 mg/5 mL liqd Take 5 mL by mouth every six (6) hours. 200 mL 0    ofloxacin (FLOXIN) 0.3 % otic solution Administer 5 Drops in right ear in the morning. 5 mL 0    eletriptan (RELPAX) 40 mg tablet Take one tablet at onset of migraine. May repeat 1 tablet in 2 hours if headache remains. Limit use to 2 days a week.  9 Tab 2    acetaminophen (TYLENOL) 325 mg tablet Take  by mouth every four (4) hours as needed for Pain. nortriptyline (PAMELOR) 25 mg capsule Take 1 Cap by mouth nightly. Anti-depressant to reduce headache frequency 30 Cap 1       Past History     Past Medical History:  Past Medical History:   Diagnosis Date    Allergies     Asthma        Past Surgical History:  Past Surgical History:   Procedure Laterality Date    HX  SECTION      HX  SECTION      x2    HX CHOLECYSTECTOMY      HX CHOLECYSTECTOMY  2017    HX GYN      LEEP    HX GYN      ectopic pregnancy    HX TONSIL AND ADENOIDECTOMY         Family History:  No family history on file. Social History:  Social History     Tobacco Use    Smoking status: Every Day     Packs/day: 0.25     Types: Cigarettes    Smokeless tobacco: Never   Substance Use Topics    Alcohol use: Yes     Alcohol/week: 30.0 standard drinks     Types: 30 Standard drinks or equivalent per week     Comment: pt states she stopped drinking 4 days ago; pt states she was drinking 4 shots a night    Drug use: No       Allergies: Allergies   Allergen Reactions    Latex Other (comments)     PATIENT STATES SHE HAS USED LATEX AT WORK WITH NO REACTION; NO ALLERGY    Amoxicillin Hives    Morphine Nausea and Vomiting    Pcn [Penicillins] Hives         Review of Systems     Review of Systems    A 10 point review of system was performed and was negative except as noted above in HPI    Physical Exam     Physical Exam  Vitals and nursing note reviewed. Constitutional:       General: She is not in acute distress. Appearance: She is well-developed. She is not diaphoretic. HENT:      Head: Normocephalic and atraumatic. Eyes:      Extraocular Movements: Extraocular movements intact. Conjunctiva/sclera: Conjunctivae normal.   Cardiovascular:      Rate and Rhythm: Normal rate and regular rhythm. Heart sounds: Normal heart sounds.    Pulmonary:      Effort: Pulmonary effort is normal.      Breath sounds: Wheezing present. Abdominal:      Palpations: Abdomen is soft. Tenderness: There is no abdominal tenderness. Musculoskeletal:      Cervical back: Neck supple. Right lower leg: No tenderness. No edema. Left lower leg: No tenderness. No edema. Neurological:      General: No focal deficit present. Mental Status: She is alert and oriented to person, place, and time. Lab and Diagnostic Study Results     Labs -   No results found for this or any previous visit (from the past 12 hour(s)). Radiologic Studies -   [unfilled]  CT Results  (Last 48 hours)      None          CXR Results  (Last 48 hours)                 08/10/22 0739  XR CHEST PA LAT Final result    Impression:  No acute cardiopulmonary disease. Narrative: Indication: Cough. Exam: PA and lateral views of the chest.       There is no prior study for direct comparison. Findings: Cardiomediastinal silhouette is within normal limits. Lungs are clear   bilaterally. Pleural spaces are normal. Osseous structures are intact. Medical Decision Making and ED Course   - I am the first and primary provider for this patient AND AM THE PRIMARY PROVIDER OF RECORD. - I reviewed the vital signs, available nursing notes, past medical history, past surgical history, family history and social history. - Initial assessment performed. The patients presenting problems have been discussed, and the staff are in agreement with the care plan formulated and outlined with them. I have encouraged them to ask questions as they arise throughout their visit. Vital Signs-Reviewed the patient's vital signs.     Patient Vitals for the past 24 hrs:   Temp Pulse Resp BP SpO2   08/11/22 0649 98.5 °F (36.9 °C) 91 20 131/83 97 %   08/11/22 0500 -- -- -- -- 98 %   08/11/22 0421 98.3 °F (36.8 °C) 89 24 (!) 142/83 98 %       Records Reviewed: Nursing Notes and Old Medical Records    Provider Notes (Medical Decision Making):       Patient with recent history of URI who presents to the ED with shortness of breath. She does have faint scattered wheezing throughout her lungs. She does not appear in respiratory distress. She did receive 1 round of bronchodilators and a dose of prednisone. Symptoms subsided. Lungs are clear afterward. Based on her presentation, this likely to decompensate. She does have her inhaler at home in which she was provided with a spacer from here from the ED. I did send a prescription of prednisone which she will take starting tomorrow morning. From her earache perspective, patient reports that her symptoms started improving significantly since he started taking the eardrops yesterday. Instructed to continue using the medications as prescribed. I discharged follow-up with her primary care doctor for reevaluation. Anticipatory guidance and return precautions discussed with the patient. At the time of discharge, patient's symptoms have improved significantly, hemodynamically stable, and she is able to ambulate without assistance or respiratory distress. Diagnosis     Clinical Impression:   1. Mild asthma, unspecified whether complicated, unspecified whether persistent    2. Upper respiratory tract infection, unspecified type          Disposition     Disposition: Condition resolved  DC- Adult Discharges: All of the diagnostic tests were reviewed and questions answered. Diagnosis, care plan and treatment options were discussed. The patient understands the instructions and will follow up as directed. The patients results have been reviewed with them. They have been counseled regarding their diagnosis. The patient verbally convey understanding and agreement of the signs, symptoms, diagnosis, treatment and prognosis and additionally agrees to follow up as recommended with their PCP in 24 - 48 hours. They also agree with the care-plan and convey that all of their questions have been answered. I have also put together some discharge instructions for them that include: 1) educational information regarding their diagnosis, 2) how to care for their diagnosis at home, as well a 3) list of reasons why they would want to return to the ED prior to their follow-up appointment, should their condition change. Discharged      DISCHARGE PLAN:  1. Follow-up Information       Follow up With Specialties Details Why [facility] EMERGENCY DEPT Emergency Medicine Go to  As needed, If symptoms worsen [address]  Laurel Morales MD Family Medicine Schedule an appointment as soon as possible for a visit in 3 days For reevaluation, Discuss your visit to the ER [address]  [phone]            2. Return to ED if worse   3. Current Discharge Medication List        START taking these medications    Details   predniSONE (DELTASONE) 20 mg tablet Take 2 Tablets by mouth in the morning for 4 days. With Breakfast  Qty: 8 Tablet, Refills: 0  Start date: 8/12/2022, End date: 8/16/2022               Attestations: Tara Harris MD    Please note that this dictation was completed with [employer], the [employer] voice recognition software. Quite often unanticipated grammatical, syntax, homophones, and other interpretive errors are inadvertently transcribed by the computer software. Please disregard these errors. Please excuse any errors that have escaped final proofreading. Thank you.

## 2022-08-11 NOTE — ED TRIAGE NOTES
Presents with co sob. Was seen here for same yesterday with dx of bronchitis, ear infection andgiven mdi, robitussin, zpack and ear gtts. Reports that mdi makes her cough.